# Patient Record
(demographics unavailable — no encounter records)

---

## 2024-11-06 NOTE — DISCUSSION/SUMMARY
[FreeTextEntry1] : Syncope check echo if able to approve and schedule. Palps check 7 day event monitor if able to approve and schedule.

## 2024-11-06 NOTE — REASON FOR VISIT
[Symptom and Test Evaluation] : symptom and test evaluation [FreeTextEntry1] : 22M comes in for a visit. he was in Er recently. No recent cardiac history. 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  NO recent testing As to symptoms, he came in to ER after briefly passing out at rest. No chest pain. Possible palpitations noted. No clear events leading up to syncope. he only had one such event.  He has been traveling a lot lately.

## 2024-12-05 NOTE — REASON FOR VISIT
[Symptom and Test Evaluation] : symptom and test evaluation [FreeTextEntry1] : 22M comes in for a visit. he was in Er recently. No recent cardiac history. 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  As to symptoms, he came in to ER after briefly passing out at rest. No chest pain. Possible palpitations noted. No clear events leading up to syncope. he only had one such event.  He has been traveling a lot lately.  Echo and holter completed.

## 2024-12-05 NOTE — DISCUSSION/SUMMARY
[FreeTextEntry1] : Syncope echo and holter reviewed. check stress ekg, as we unable to approve a stress echo. Patient did not schedule that or a follow up